# Patient Record
Sex: MALE | Race: WHITE | NOT HISPANIC OR LATINO | Employment: FULL TIME | URBAN - METROPOLITAN AREA
[De-identification: names, ages, dates, MRNs, and addresses within clinical notes are randomized per-mention and may not be internally consistent; named-entity substitution may affect disease eponyms.]

---

## 2024-03-31 ENCOUNTER — HOSPITAL ENCOUNTER (OUTPATIENT)
Dept: RADIOLOGY | Facility: MEDICAL CENTER | Age: 19
End: 2024-03-31
Attending: PHYSICIAN ASSISTANT
Payer: COMMERCIAL

## 2024-03-31 ENCOUNTER — OCCUPATIONAL MEDICINE (OUTPATIENT)
Dept: URGENT CARE | Facility: PHYSICIAN GROUP | Age: 19
End: 2024-03-31
Payer: COMMERCIAL

## 2024-03-31 VITALS
DIASTOLIC BLOOD PRESSURE: 76 MMHG | HEIGHT: 71 IN | OXYGEN SATURATION: 98 % | RESPIRATION RATE: 16 BRPM | HEART RATE: 80 BPM | TEMPERATURE: 98.3 F | WEIGHT: 140 LBS | BODY MASS INDEX: 19.6 KG/M2 | SYSTOLIC BLOOD PRESSURE: 132 MMHG

## 2024-03-31 DIAGNOSIS — S92.422A CLOSED DISPLACED FRACTURE OF DISTAL PHALANX OF LEFT GREAT TOE, INITIAL ENCOUNTER: ICD-10-CM

## 2024-03-31 DIAGNOSIS — S90.212A SUBUNGUAL HEMATOMA OF GREAT TOE OF LEFT FOOT, INITIAL ENCOUNTER: ICD-10-CM

## 2024-03-31 DIAGNOSIS — S90.32XA CONTUSION OF LEFT FOOT, INITIAL ENCOUNTER: ICD-10-CM

## 2024-03-31 PROCEDURE — 73630 X-RAY EXAM OF FOOT: CPT | Mod: LT

## 2024-03-31 ASSESSMENT — ENCOUNTER SYMPTOMS: TINGLING: 0

## 2024-03-31 NOTE — LETTER
"    EMPLOYEE’S CLAIM FOR COMPENSATION/ REPORT OF INITIAL TREATMENT  FORM C-4  PLEASE TYPE OR PRINT    EMPLOYEE’S CLAIM - PROVIDE ALL INFORMATION REQUESTED   First Name                    TOMASZ Hui Last Name  Gio Birthdate                    2005                Sex  []M  []F Claim Number (Insurer’s Use Only)     Home Address  [address] Age  18 y.o. Height  1.803 m (5' 11\") Weight  63.5 kg (140 lb) Social Security Number     [facility] Zip  [address] Telephone  There are no phone numbers on file.   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 (if applicable)  \"Viz Picking\" Backroom inventory    How did this injury or occupational disease occur? (Be specific and answer in detail. 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   If you believe that you have an occupational disease, when did you first have knowledge of the disability and its relationship to your employment?  N/A Witnesses to the Accident (if applicable)  [place]      Nature of Injury or Occupational Disease  Workers' " Compensation  Part(s) of Body Injured or Affected  Foot (L) Toe (L) N/A    I CERTIFY THAT THE ABOVE IS TRUE AND CORRECT TO T HE BEST OF MY KNOWLEDGE AND THAT I HAVE PROVIDED THIS INFORMATION IN ORDER TO OBTAIN THE BENEFITS OF [place], OR CHAPTER 617 [place]).  I HEREBY AUTHORIZE ANY PHYSICIAN, CHIROPRACTOR, SURGEON, PRACTITIONER OR ANY OTHER PERSON, ANY HOSPITAL, INCLUDING [facility] OR [facility], ANY  MEDICAL SERVICE ORGANIZATION, ANY INSURANCE COMPANY, OR OTHER INSTITUTION OR ORGANIZATION TO RELEASE TO EACH OTHER, ANY MEDICAL OR OTHER INFORMATION, INCLUDING BENEFITS PAID OR PAYABLE, PERTINENT TO THIS INJURY OR DISEASE, EXCEPT INFORMATION RELATIVE TO DIAGNOSIS, TREATMENT AND/OR COUNSELING FOR AIDS, PSYCHOLOGICAL CONDITIONS, ALCOHOL OR CONTROLLED SUBSTANCES, FOR WHICH I MUST GIVE SPECIFIC AUTHORIZATION.  A PHOTOSTAT OF THIS AUTHORIZATION SHALL BE VALID AS THE ORIGINAL.     Date   Place Employee’s Original or  *Electronic Signature   THIS REPORT MUST BE COMPLETED AND MAILED WITHIN 3 WORKING DAYS OF TREATMENT   Place  [facility]    Name of [place]   Date 3/31/2024 Diagnosis and Description of Injury or Occupational Disease  (S92.422A) Closed displaced fracture of distal phalanx of left great toe, initial encounter  (S90.212A) Subungual hematoma of great toe of left foot, initial encounter  Diagnoses of Closed displaced fracture of distal phalanx of left great toe, initial encounter and Subungual hematoma of great toe of left foot, initial encounter were pertinent to this visit. Is there evidence that the injured employee was under the influence of alcohol and/or another controlled substance at the time of accident?  []No  [] Yes (if yes, please explain)   Hour 3:38 PM  No   Treatment: Patient released to return to restricted duty.  Please allow patient to wear walking boot at all times while  at work.  Limit lifting to 25 pounds.  No climbing.  Advised ice, elevation and anti-inflammatory use to decrease pain and swelling.  We deferred performing nail trephination in clinic as the injury occurred 4 days ago and blood is likely coagulated at this time.  Referral placed to follow-up with occupational medicine and orthopedics on or before 4/8/2024.    Have you advised the patient to remain off work five days or more?   [] Yes Indicate dates: From   To    []No If no, is the injured employee capable of: [] full duty [] modified duty                                                             No  Yes  If modified duty, specify any limitations / restrictions:  See D-39                                                                                                                                                                                                                                                                                                                                                                                                               X-Ray Findings: Positive  Comments:Mildly displaced tuft fracture to left great toe    From information given by the employee, together with medical evidence, can you directly connect this injury or occupational disease as job incurred?  []Yes   [] No Yes    Is additional medical care by a physician indicated? []Yes [] No  Yes    Do you know of any previous injury or disease contributing to this condition or occupational disease? []Yes [] No (Explain if yes)                          No   Date  3/31/2024 Print Health Care Provider’s Name  Ishaan Welsh P.A.-C. I certify that the employer’s copy of  this form was delivered to the employer on:   Address  [address] INSURER'S USE ONLY                       [place]  [address] Provider’s Tax ID Number  [id number]   Telephone  Dept: [phone]    Health Care Provider’s Original or  "Electronic Signature  india-RAYA Lindquist P.A.-C. Degree (MD,DO, DC,KALYANI,APRN)  KALYANI  Choose (if applicable)      ORIGINAL - TREATING HEALTHCARE PROVIDER PAGE 2 - INSURER/TPA PAGE 3 - EMPLOYER PAGE 4 - EMPLOYEE             Form C-4 (rev.08/23)        BRIEF DESCRIPTION OF RIGHTS AND BENEFITS  (Pursuant to [place] 616C.050)    Notice of Injury or Occupational Disease (Incident Report Form C-1): If an injury or occupational disease (OD) arises out of and in the course of employment, you must provide written notice to your employer as soon as practicable, but no later than 7 days after the accident or OD. Your employer shall maintain a sufficient supply of the required forms.    Claim for Compensation (Form C-4): If medical treatment is sought, the form C-4 is available at the place of initial treatment. A completed \"Claim for Compensation\" (Form C-4) must be filed within 90 days after an accident or OD. The treating physician or chiropractor must, within 3 working days after treatment, complete and mail to the employer, the employer's insurer and third-party , the Claim for Compensation.    Medical Treatment: If you require medical treatment for your on-the-job injury or OD, you may be required to select a physician or chiropractor from a list provided by your workers’ compensation insurer, if it has contracted with an Organization for Managed Care (MCO) or Preferred Provider Organization (PPO) or providers of health care. If your employer has not entered into a contract with an MCO or PPO, you may select a physician or chiropractor from the Panel of Physicians and Chiropractors. Any medical costs related to your industrial injury or OD will be paid by your insurer.    Temporary Total Disability (TTD): If your doctor has certified that you are unable to work for a period of at least 5 consecutive days, or 5 cumulative days in a 20-day period, or places restrictions on you that your employer does not " accommodate, you may be entitled to TTD compensation.    Temporary Partial Disability (TPD): If the wage you receive upon reemployment is less than the compensation for TTD to which you are entitled, the insurer may be required to pay you TPD compensation to make up the difference. TPD can only be paid for a maximum of 24 months.    Permanent Partial Disability (PPD): When your medical condition is stable and there is an indication of a PPD as a result of your injury or OD, within 30 days, your insurer must arrange for an evaluation by a rating physician or chiropractor to determine the degree of your PPD. The amount of your PPD award depends on the date of injury, the results of the PPD evaluation, your age and wage.    Permanent Total Disability (PTD): If you are medically certified by a treating physician or chiropractor as permanently and totally disabled and have been granted a PTD status by your insurer, you are entitled to receive monthly benefits not to exceed 66 2/3% of your average monthly wage. The amount of your PTD payments is subject to reduction if you previously received a lump-sum PPD award.    Vocational Rehabilitation Services: You may be eligible for vocational rehabilitation services if you are unable to return to the job due to a permanent physical impairment or permanent restrictions as a result of your injury or occupational disease.    Transportation and Per Iris Reimbursement: You may be eligible for travel expenses and per iris associated with medical treatment.    Reopening: You may be able to reopen your claim if your condition worsens after claim closure.     Appeal Process: If you disagree with a written determination issued by the insurer or the insurer does not respond to your request, you may appeal to the Department of Administration, , by following the instructions contained in your determination letter. You must appeal the determination within 70 days from the date  of the determination letter at [address], [address], [place], [place] [address], or [address], [address], [place], [place] [address]. If you disagree with the  decision, you may appeal to the Department of Administration, . You must file your appeal within 30 days from the date of the  decision letter at [address], [address], [place], [place] [address], or [address], [address], [place], [place] [address]. If you disagree with a decision of an , you may file a petition for judicial review with the District Court. You must do so within 30 days of the Appeal Officer’s decision. You may be represented by an  at your own expense or you may contact the [facility] for possible representation.    [place]  for Injured Workers (NAIW): If you disagree with a  decision, you may request that NAIW represent you without charge at an  Hearing. For information regarding denial of benefits, you may contact the [facility] at: [address], [address], [place] [address], [phone], or [address], [address], [place] [address], [phone]    To File a Complaint with the Division: If you wish to file a complaint with the  of the Division of Industrial Relations (DIR),  please contact the Workers’ Compensation Section, [address], [address], [place], [place] [address], telephone [phone]) [phone], or [address], [address], [place], [place] [address], telephone ([phone]) [phone].    For assistance with Workers’ Compensation Issues: You may contact the [facility] Office for Consumer Health Assistance, [address], [address], [place], [place] [address], Toll Free 1-491.439.2794, Web site: http://[facility].[place].gov/Programs/MEGGAN E-mail: meggan@[facility].nv.gov              __________________________________________________________________                                     _________________            Employee Name / Signature                                                                                                                            Date                                                                                                                                                                                                                              D-2 (rev. 10/20)

## 2024-03-31 NOTE — LETTER
[facility]  [facility] [address] [place] [address]  Phone:  [phone] - Fax:  [phone]   [facility] Progress Report and Disability Certification  Date of Service: 3/31/2024   No Show:  No  Date / Time of Next Visit: 4/8/2024   Claim Information   Patient Name: Ez Powers  Claim Number:     Employer: [employer]  Date of Injury: 3/27/2024     Insurer / TPA: [employer]  ID / SSN:     Occupation: Stocking 1  Diagnosis: Diagnoses of Closed displaced fracture of distal phalanx of left great toe, initial encounter and Subungual hematoma of great toe of left foot, initial encounter were pertinent to this visit.    Medical Information   Related to Industrial Injury? Yes    Subjective Complaints:  HPI:  DOI: 3/27/2024  JACKSON:  This is a very pleasant 18-year-old male presented to the clinic after sustaining work-related injury.  The patient works at [employer].  States he was describing a box off the top shelf.  When sliding the box off the shelf the bottom of the box broke and a dumbbell fell out landing on his left foot primarily on his left great toe.  Dumbbell weight 30 pounds.  He was able to complete his workday at that time.  He has continued to have pain since the incident.  Pain aggravated with weightbearing.  Currently using crutches for ambulation at this time.  Nail is attached.  Denies any preceding injury or trauma.  No secondary employment.   Objective Findings: Constitutional: Pt is oriented to person, place, and time.  Appears well-developed and well-nourished. No distress.   Eyes: Conjunctivae are normal.   Cardiovascular: Normal rate.    Pulmonary/Chest: Effort normal.   Musculoskeletal: Left foot: Subungual hematoma of the left great toe.  Nail attached.  Mild swelling and discoloration to the great toe.  Patient has tenderness to palpation diffusely over the great toe as well as over the first MTP joint.  No tenderness,  bruising or discoloration over the midfoot.  Gait is antalgic.  Neurological: Pt is alert and oriented to person, place, and time. Coordination normal.   Skin: Skin is warm. Pt is not diaphoretic. No erythema.   Psychiatric: Pt has a normal mood and affect.  Behavior is normal.      Pre-Existing Condition(s):     Assessment:   Initial Visit    Status: Additional Care Required  Permanent Disability:No    Plan: Transfer Care    Diagnostics: X-ray    Comments:  X-rays reveal a mildly displaced tuft fracture to the left great toe    Disability Information   Status: Released to Restricted Duty    From:  3/31/2024  Through: 4/8/2024 Restrictions are: Temporary   Physical Restrictions   Sitting:    Standing:    Stooping:    Bending:      Squatting:    Walking:    Climbing:    Pushing:      Pulling:    Other:    Reaching Above Shoulder (L):   Reaching Above Shoulder (R):       Reaching Below Shoulder (L):    Reaching Below Shoulder (R):      Not to exceed Weight Limits   Carrying(hrs):   Weight Limit(lb):   Lifting(hrs):   Weight  Limit(lb):     Comments: Patient released to return to restricted duty.  Please allow patient to wear walking boot at all times while at work.  Limit lifting to 25 pounds.  No climbing.  Advised ice, elevation and anti-inflammatory use to decrease pain and swelling.  We deferred performing nail trephination in clinic as the injury occurred 4 days ago and blood is likely coagulated at this time.  Referral placed to follow-up with occupational medicine and orthopedics on or before 4/8/2024.      Repetitive Actions   Hands: i.e. Fine Manipulations from Grasping:     Feet: i.e. Operating Foot Controls:     Driving / Operate Machinery:     Health Care Provider’s Original or Electronic Signature  Ishaan Welsh P.A.-C. Health Care Provider’s Original or Electronic Signature    Tan Miller DO MPH     Clinic Name / Location: [facility] [address] [address] Clinic Phone  Number: Dept: [phone]   Appointment Time: 3:00 Pm Visit Start Time: 3:38 PM   Check-In Time:  3:28 Pm Visit Discharge Time:  4:45 PM   Original-Treating Physician or Chiropractor    Page 2-Insurer/TPA    Page 3-Employer    Page 4-Employee

## 2024-03-31 NOTE — PROGRESS NOTES
"Subjective     Ez Powers is a 18 y.o. male who presents with Foot Injury (Workers Comp new Lft foot injury. Box fell with dumb bell inside landing on Lft foot.)      HPI:  DOI: 3/27/2024  JACKSON:  This is a very pleasant 18-year-old male presented to the clinic after sustaining work-related injury.  The patient works at [employer]  States he was describing a box off the top shelf.  When sliding the box off the shelf the bottom of the box broke and a dumbbell fell out landing on his left foot primarily on his left great toe.  Dumbbell weight 30 pounds.  He was able to complete his workday at that time.  He has continued to have pain since the incident.  Pain aggravated with weightbearing.  Currently using crutches for ambulation at this time.  Nail is attached.  Denies any preceding injury or trauma.  No secondary employment.       Review of Systems   Musculoskeletal:         Contusion of left foot   Neurological:  Negative for tingling.          PMH:   No pertinent past medical history to this problem  MEDS:  Medications were reviewed in EMR  ALLERGIES:  Allergies were reviewed in EMR  FH:   No pertinent family history to this problem      Objective     /76   Pulse 80   Temp 36.8 °C (98.3 °F) (Temporal)   Resp 16   Ht 1.803 m (5' 11\")   Wt 63.5 kg (140 lb)   SpO2 98%   BMI 19.53 kg/m²      Physical Exam    Constitutional: Pt is oriented to person, place, and time.  Appears well-developed and well-nourished. No distress.   Eyes: Conjunctivae are normal.   Cardiovascular: Normal rate.    Pulmonary/Chest: Effort normal.   Musculoskeletal: Left foot: Subungual hematoma of the left great toe.  Nail attached.  Mild swelling and discoloration to the great toe.  Patient has tenderness to palpation diffusely over the great toe as well as over the first MTP joint.  No tenderness, bruising or discoloration over the midfoot.  Gait is antalgic.  Neurological: Pt is alert and oriented to person, place, and " time. Coordination normal.   Skin: Skin is warm. Pt is not diaphoretic. No erythema.   Psychiatric: Pt has a normal mood and affect.  Behavior is normal.        RADIOLOGY RESULTS   DX-FOOT-COMPLETE 3+ LEFT    Result Date: 3/31/2024  3/31/2024 4:07 PM HISTORY/REASON FOR EXAM: Left foot pain after fall at work 5 days ago. TECHNIQUE/EXAM DESCRIPTION AND NUMBER OF VIEWS: 3 nonweightbearing views of the LEFT foot. COMPARISON: FINDINGS: Bone mineralization is normal. There is a mildly displaced fracture involving the tuft of the great toe. There is soft tissue swelling.     Mildly displaced fracture involving the tuft of the great toe.              Assessment & Plan        1. Closed displaced fracture of distal phalanx of left great toe, initial encounter  DX-FOOT-COMPLETE 3+ LEFT    Referral to Orthopedics    Referral to Occupational Medicine      2. Subungual hematoma of great toe of left foot, initial encounter  DX-FOOT-COMPLETE 3+ LEFT           Patient released to return to restricted duty.  Please allow patient to wear walking boot at all times while at work.  Limit lifting to 25 pounds.  No climbing.  Advised ice, elevation and anti-inflammatory use to decrease pain and swelling.  We deferred performing nail trephination in clinic as the injury occurred 4 days ago and blood is likely coagulated at this time.  Referral placed to follow-up with occupational medicine and orthopedics on or before 4/8/2024.    Differential diagnosis, natural history, supportive care, and indications for immediate follow-up discussed at length.

## 2024-04-08 ENCOUNTER — OCCUPATIONAL MEDICINE (OUTPATIENT)
Dept: URGENT CARE | Facility: PHYSICIAN GROUP | Age: 19
End: 2024-04-08
Payer: COMMERCIAL

## 2024-04-08 VITALS
TEMPERATURE: 96.7 F | BODY MASS INDEX: 19.18 KG/M2 | HEIGHT: 71 IN | SYSTOLIC BLOOD PRESSURE: 108 MMHG | HEART RATE: 75 BPM | OXYGEN SATURATION: 98 % | WEIGHT: 137 LBS | RESPIRATION RATE: 16 BRPM | DIASTOLIC BLOOD PRESSURE: 70 MMHG
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
## 2024-04-08 DIAGNOSIS — S92.422D CLOSED DISPLACED FRACTURE OF DISTAL PHALANX OF LEFT GREAT TOE WITH ROUTINE HEALING, SUBSEQUENT ENCOUNTER: ICD-10-CM

## 2024-04-08 PROCEDURE — 3078F DIAST BP <80 MM HG: CPT

## 2024-04-08 PROCEDURE — 99213 OFFICE O/P EST LOW 20 MIN: CPT

## 2024-04-08 PROCEDURE — 3074F SYST BP LT 130 MM HG: CPT

## 2024-04-08 ASSESSMENT — ENCOUNTER SYMPTOMS
FEVER: 0
CHILLS: 0

## 2024-04-08 NOTE — LETTER
[facility]  [address] [facility] [place] [address]  Phone:  [phone] - Fax:  [phone]   [facility] Progress Report and Disability Certification  Date of Service: 4/8/2024   No Show:  No  Date / Time of Next Visit: 4/15/2024   Claim Information   Patient Name: Ez Powers  Claim Number:     Employer: [id number]  Date of Injury: 3/27/2024     Insurer / TPA: [employer]  ID / SSN:     Occupation: Stocking 1  Diagnosis: Diagnoses of Closed displaced fracture of distal phalanx of left great toe with routine healing, subsequent encounter and Work related injury were pertinent to this visit.    Medical Information   Related to Industrial Injury? Yes    Subjective Complaints:  Copied from previous visit:     DOI: 3/27/2024  JACKSON: This is a very pleasant 18-year-old male presented to the clinic after sustaining work-related injury.  The patient works at [employer].  States he was describing a box off the top shelf.  When sliding the box off the shelf the bottom of the box broke and a dumbbell fell out landing on his left foot primarily on his left great toe.  Dumbbell weight 30 pounds.  He was able to complete his workday at that time.  He has continued to have pain since the incident.  Pain aggravated with weightbearing.  Currently using crutches for ambulation at this time.  Nail is attached.  Denies any preceding injury or trauma.  No secondary employment.     FV #1 - 4/8/24  The patient reports that he is still having pain in the left foot. The pain is worse with weightbearing and the pain is improved with rest and elevation. He reports that the swelling has significantly improved with supportive measures. He is taking Excedrin in the AM, afternoon, and PM for pain. He feels that the anti-inflammatories are mildly helpful at improving his pain/inflammation. He has been adhering to all restrictions. Denies numbness in the foot. Intermittent  "\"pins and needles\" feeling. Subungual hematoma remains the same. Not worsening. No fevers. WBAT. Using crutches while wearing the boot.    Objective Findings: Left great toe: Subungual hematoma present without damage to the nail. Mild swelling and bruising. NV intact. Patient has tenderness to palpation diffusely over the great toe as well as over the first MTP joint.  No tenderness, bruising or discoloration over the midfoot.  Gait is antalgic. Limited flexion of the toe secondary to pain and swelling.   Pre-Existing Condition(s):     Assessment:   Condition Improved    Status: Additional Care Required  Permanent Disability:No    Plan: Transfer Care  Comments:Occ med, orthopedics    Diagnostics: X-ray    Comments:  X-ray images viewed and interpreted by APRN, confirmed by radiology:        RADIOLOGY RESULTS  DX-FOOT-COMPLETE 3+ LEFT    Result Date: 3/31/2024  3/31/2024 4:07 PM HISTORY/REASON FOR EXAM: Left foot pain after fall at work 5 days ago. TECHNIQUE/EXAM DESCRIPTION AND NUMBER OF VIEWS: 3 nonweightbearing views of the LEFT foot. COMPARISON: FINDINGS: Bone mineralization is normal. There is a mildly displaced fracture involving the tuft of the great toe. There is soft tissue swelling.     Mildly displaced fracture involving the tuft of the great toe.            Disability Information   Status: Released to Restricted Duty    From:  2024  Through: 4/15/2024 Restrictions are:     Physical Restrictions   Sitting:    Standin hrs/day Stoopin hrs/day Bending:      Squattin hrs/day Walkin hrs/day Climbin hrs/day Pushin hrs/day   Pulling:    Other:    Reaching Above Shoulder (L):   Reaching Above Shoulder (R):       Reaching Below Shoulder (L):    Reaching Below Shoulder (R):      Not to exceed Weight Limits   Carrying(hrs):   Weight Limit(lb): < or = to 25 pounds Lifting(hrs):   Weight  Limit(lb): < or = to 25 pounds   Comments: Advised continued light duty. Avoid periods of walking " as patient will need to avoid excessive amounts of time on his injured foot. Breaks PRN to elevate/ice the foot. Continue RICE therapy at home. Orthopedic consultation on 4/11/23. WBAT with boot/crutches.    Repetitive Actions   Hands: i.e. Fine Manipulations from Grasping:     Feet: i.e. Operating Foot Controls: 0 hrs/day   Driving / Operate Machinery:     Health Care Provider’s Original or Electronic Signature  JOSÉ MANUEL Dozier Health Care Provider’s Original or Electronic Signature    Tan Miller DO MPH     Clinic Name / Location: [address] [address] Clinic Phone Number: Dept: [phone]   Appointment Time: 5:45 Pm Visit Start Time: 6:30 PM   Check-In Time:  5:54 Pm Visit Discharge Time:  7:158 PM   Original-Treating Physician or Chiropractor    Page 2-Insurer/TPA    Page 3-Employer    Page 4-Employee

## 2024-04-09 NOTE — PROGRESS NOTES
"Subjective:   Ez Powers is a very pleasant 18 y.o. male who presents for:    Chief Complaint   Patient presents with    Toe Injury     Worker comp f/u, concerned about top of toe       HPI:    Copied from previous visit:     DOI: 3/27/2024  JACKSON: This is a very pleasant 18-year-old male presented to the clinic after sustaining work-related injury.  The patient works at [employer].  States he was describing a box off the top shelf.  When sliding the box off the shelf the bottom of the box broke and a dumbbell fell out landing on his left foot primarily on his left great toe.  Dumbbell weight 30 pounds.  He was able to complete his workday at that time.  He has continued to have pain since the incident.  Pain aggravated with weightbearing.  Currently using crutches for ambulation at this time.  Nail is attached.  Denies any preceding injury or trauma.  No secondary employment.     FV #1 - 4/8/24  The patient reports that he is still having pain in the left foot. The pain is worse with weightbearing and the pain is improved with rest and elevation. He reports that the swelling has significantly improved with supportive measures. He is taking Excedrin in the AM, afternoon, and PM for pain. He feels that the anti-inflammatories are mildly helpful at improving his pain/inflammation. He has been adhering to all restrictions. Denies numbness in the foot. Intermittent \"pins and needles\" feeling. Subungual hematoma remains the same. Not worsening. No fevers. WBAT. Using crutches while wearing the boot.     ROS:    Review of Systems   Constitutional:  Negative for chills, fever and malaise/fatigue.   Musculoskeletal:         Left great toe injury   All other systems reviewed and are negative.      Medications:      Current Outpatient Medications   Medication Sig    asa/apap/caffeine (EXCEDRIN) 250-250-65 MG Tab Take 1 Tablet by mouth every 6 hours as needed for Headache.       Allergies:     No Known " Allergies    Problem List:     There is no problem list on file for this patient.      Surgical History:    No past surgical history on file.    Past Social Hx:     Social History     Socioeconomic History    Marital status:    Tobacco Use    Smoking status: Every Day     Types: Cigarettes    Smokeless tobacco: Never   Vaping Use    Vaping Use: Every day   Substance and Sexual Activity    Alcohol use: Yes     Comment: occ.    Drug use: Yes     Types: Marijuana     Comment: daily        Past Family Hx:      No family history on file.    Problem list, medications, and allergies reviewed by myself today in Epic.     Objective:     There were no vitals filed for this visit.    Physical Exam  Vitals reviewed.   Constitutional:       Appearance: Normal appearance. He is normal weight.   Musculoskeletal:        Feet:    Feet:      Comments: Left great toe: Subungual hematoma present without damage to the nail. Mild swelling and bruising. NV intact. Patient has tenderness to palpation diffusely over the great toe as well as over the first MTP joint.  No tenderness, bruising or discoloration over the midfoot.  Gait is antalgic. Limited flexion of the toe secondary to pain and swelling.  Neurological:      Mental Status: He is alert.       X-ray images viewed and interpreted by APRN, confirmed by radiology:        RADIOLOGY RESULTS   DX-FOOT-COMPLETE 3+ LEFT    Result Date: 3/31/2024  3/31/2024 4:07 PM HISTORY/REASON FOR EXAM: Left foot pain after fall at work 5 days ago. TECHNIQUE/EXAM DESCRIPTION AND NUMBER OF VIEWS: 3 nonweightbearing views of the LEFT foot. COMPARISON: FINDINGS: Bone mineralization is normal. There is a mildly displaced fracture involving the tuft of the great toe. There is soft tissue swelling.     Mildly displaced fracture involving the tuft of the great toe.                   Assessment/Plan:     Diagnosis and associated orders:     1. Closed displaced fracture of distal phalanx of left great  toe with routine healing, subsequent encounter    2. Work related injury    Other orders  - asa/apap/caffeine (EXCEDRIN) 250-250-65 MG Tab; Take 1 Tablet by mouth every 6 hours as needed for Headache.          Comments/MDM:     See [id number]  Advised continued light duty. Avoid periods of walking as patient will need to avoid excessive amounts of time on his injured foot. Breaks PRN to elevate/ice the foot. Continue RICE therapy at home. Orthopedic consultation on 4/11/23. WBAT with boot/crutches.  RTC in 7 days         All questions answered. Patient verbalized understanding and is in agreement with this plan of care.     If symptoms are worsening or not improving in 3-5 days, follow-up with PCP or return to UC. Differential diagnosis, natural history, and supportive care discussed. AVS handout given and reviewed with patient. Patient educated on red flags and when to seek treatment back in ED or UC.     I personally reviewed prior external notes and test results pertinent to today's visit.  I have independently reviewed and interpreted all diagnostics ordered during this urgent care visit.     This dictation has been created using voice recognition software. The accuracy of the dictation is limited by the abilities of the software. I expect there may be some errors of grammar and possibly content. I made every attempt to manually correct the errors within my dictation. However, errors related to voice recognition software may still exist and should be interpreted within the appropriate context.    This note was electronically signed by JOSÉ MANUEL Perez

## 2025-04-13 ENCOUNTER — HOSPITAL ENCOUNTER (EMERGENCY)
Facility: MEDICAL CENTER | Age: 20
End: 2025-04-14
Attending: STUDENT IN AN ORGANIZED HEALTH CARE EDUCATION/TRAINING PROGRAM

## 2025-04-13 VITALS
RESPIRATION RATE: 16 BRPM | WEIGHT: 140 LBS | HEART RATE: 80 BPM | OXYGEN SATURATION: 96 % | HEIGHT: 71 IN | BODY MASS INDEX: 19.6 KG/M2 | SYSTOLIC BLOOD PRESSURE: 124 MMHG | TEMPERATURE: 98.3 F | DIASTOLIC BLOOD PRESSURE: 80 MMHG

## 2025-04-13 DIAGNOSIS — R45.851 SUICIDAL IDEATION: ICD-10-CM

## 2025-04-13 DIAGNOSIS — F10.920 ALCOHOLIC INTOXICATION WITHOUT COMPLICATION (HCC): ICD-10-CM

## 2025-04-13 LAB — POC BREATHALIZER: 0.11 PERCENT (ref 0–0.01)

## 2025-04-13 PROCEDURE — 80307 DRUG TEST PRSMV CHEM ANLYZR: CPT

## 2025-04-13 PROCEDURE — 99284 EMERGENCY DEPT VISIT MOD MDM: CPT

## 2025-04-13 PROCEDURE — 302970 POC BREATHALIZER

## 2025-04-13 PROCEDURE — 302970 POC BREATHALIZER: Performed by: STUDENT IN AN ORGANIZED HEALTH CARE EDUCATION/TRAINING PROGRAM

## 2025-04-14 LAB
AMPHET UR QL SCN: NEGATIVE
BARBITURATES UR QL SCN: NEGATIVE
BENZODIAZ UR QL SCN: NEGATIVE
BZE UR QL SCN: NEGATIVE
CANNABINOIDS UR QL SCN: POSITIVE
FENTANYL UR QL: NEGATIVE
METHADONE UR QL SCN: NEGATIVE
OPIATES UR QL SCN: NEGATIVE
OXYCODONE UR QL SCN: NEGATIVE
PCP UR QL SCN: NEGATIVE
POC BREATHALIZER: 0.06 PERCENT (ref 0–0.01)
PROPOXYPH UR QL SCN: NEGATIVE

## 2025-04-14 PROCEDURE — 302970 POC BREATHALIZER

## 2025-04-14 NOTE — ED NOTES
Patient observed in room, calm and cooperative. Mood appears stable; resting in NAD.  Verbalized no immediate needs or complaints.

## 2025-04-14 NOTE — CONSULTS
"[facility]   TRIAGE ASSESSMENT    Name: Ez Powers  MRN: [id number]  : 2005  Age: 19 y.o.  Date of assessment: 2025  PCP: Pcp Pt States None  Persons in attendance: Patient  Patient Location: [facility]  (This evaluation was conducted over the phone. The patient was located at [facility] and the Alert Team RN /Psychologist was located at [facility]. The patients identity was confirmed and verbal consent for the telephone conference was obtained.)     CHIEF COMPLAINT/PRESENTING ISSUE (as stated by Patient, ER RN, ERP):   Chief Complaint   Patient presents with    Suicidal Ideation     BIB-EMS Pt coming from home stating SI with no plan. Pt states having \"a couple beers and a few shots at home.\" Pt admits to having verbal altercation with fiance also states having a  baby at home that has caused added stressers. Pt is calm, cooperative, and seeking help for intrusive thoughts.       Patient is a 20 y/o male presenting to ER endorsing suicidal ideation without a plan or intent, triggered following argument with deric while intoxicated (initial breathalyzer of 0.122) Patient is now clinically sober and adamantly denies desire or plan to harm himself. Patient voices his baby as his reason for living. Reports small argument with deric was magnified in his mind d/t the alcohol. Patient reports he does not drink excessively, declines any assistance with alcohol cessation. Patient denies any hx of suicidal attempts but admits to cutting behaviors in highschool with last incident of self harm 2 years ago. Patient reports brief hx of of therapy through high school counselor and is now open to connecting with PMH provider in the community. Patient able to contract for safety; discussed with ERP and in agreement that patient does not meet legal hold criteria at this time.     CURRENT LIVING SITUATION/SOCIAL " SUPPORT/FINANCIAL RESOURCES: Patient resides with deric of 3 years and their 10 month old baby. Patient employed through [employer]. Patient reports good social support from  family    BEHAVIORAL HEALTH/SUBSTANCE USE TREATMENT HISTORY  Does patient/parent report a history of prior behavioral health/substance use treatment for patient?   No: Patient reports a brief hx of speaking with school counselor several years ago.     SAFETY ASSESSMENT - SELF  Does patient acknowledge current or past symptoms of dangerousness to self or is previous history noted? Yes; arrives to ER endorsing SI without plan or intent.   Does parent/significant other report patient has current or past symptoms of dangerousness to self? N\A  Does presenting problem suggest symptoms of dangerousness to self? No; patient is now clinically sober and denies any active thoughts of suicide or self harm; reports depression and intermittent/passive SI for several years; patient denies any hx of suicidal attempts, reports cutting behaviors in high school.    SAFETY ASSESSMENT - OTHERS  Does patient acknowledge current or past symptoms of aggressive behavior or risk to others or is previous history noted? no  Does parent/significant other report patient has current or past symptoms of aggressive behavior or risk to others?  N\A  Does presenting problem suggest symptoms of dangerousness to others? No; denies HI    LEGAL HISTORY  Does patient acknowledge history of arrest/senior living/correction or is previous history noted?     Crisis Safety Plan completed and copy given to patient? yes    ABUSE/NEGLECT SCREENING  Does patient report feeling “unsafe” in his/her home, or afraid of anyone?  no  Does patient report any history of physical, sexual, or emotional abuse?  no  Does parent or significant other report any of the above? N\A  Is there evidence of neglect by self?  no  Is there evidence of neglect by a caregiver? no  Does the patient/parent report any history of  "CPS/APS/police involvement related to suspected abuse/neglect or domestic violence? no  Based on the information provided during the current assessment, is a mandated report of suspected abuse/neglect being made?  No    SUBSTANCE USE SCREENING  Yes:  Nixon all substances used in the past 30 days:      Last Use Amount   [x]   Alcohol 4/13/25 \"Several beers and a couple shots\"   []   Marijuana     []   Heroin     []   Prescription Opioids  (used without prescription, for    recreation, or in excess of prescribed amount)     []   Other Prescription  (used without prescription, for    recreation, or in excess of prescribed amount)     []   Cocaine      []   Methamphetamine     []   \"\" drugs (ectasy, MDMA)     []   Other substances        UDS results: THC  Breathalyzer results: 0.112, 0.057    What consequences does the patient associate with any of the above substance use and or addictive behaviors? None    Risk factors for detox (check all that apply):  []  Seizures   []  Diaphoretic (sweating)   []  Tremors   []  Hallucinations   []  Increased blood pressure   []  Decreased blood pressure   []  Other   [x]  None      [x] Patient education on risk factors for detoxification and instructed to return to ER as needed.      MENTAL STATUS   Participation: Active verbal participation and Engaged  Grooming:  unable to assess  Orientation: Fully Oriented and Drowsy/Somnolent  Behavior: Calm  Eye contact: unable to assess  Mood: Depressed  Affect: unable to assess  Thought process: Logical and Goal-directed  Thought content: Within normal limits  Speech: Rate within normal limits and Volume within normal limits  Perception: Within normal limits  Memory:  No gross evidence of memory deficits  Insight: Adequate  Judgment:  Adequate  Other:    Collateral information:   Source:  [] Significant other present in person:   [] Significant other by telephone  [] [facility]   [x] [facility]  [x] [facility] " [facility]  [x] Other: ERP    [] Unable to complete full assessment due to:  [] Acute intoxication  [] Patient declined to participate/engage  [] Patient verbally unresponsive  [] Significant cognitive deficits  [] Significant perceptual distortions or behavioral disorganization  [x] Other: N/A     CLINICAL IMPRESSIONS:  Primary:  Passive SI, Alcohol Intoxication  Secondary:  Depression, Alcohol use, relational discord, depression       IDENTIFIED NEEDS/PLAN:  [Trigger DISPOSITION list for any items marked]    []  Imminent safety risk - self [] Imminent safety risk - others   []  Acute substance withdrawal []  Psychosis/Impaired reality testing   [x]  Mood/anxiety [x]  Substance use/Addictive behavior   []  Maladaptive behaviro []  Parent/child conflict   [x]  Family/Couples conflict []  Biomedical   []  Housing [x]  Financial   []   Legal  Occupational/Educational   []  Domestic violence []  Other:     Recommended Plan of Care:  Refer to [facility], [facility], and [facility] d/t uninsured status.     Has the Recommended Plan of Care/Level of Observation been reviewed with the patient's assigned nurse? yes    Does patient/parent or guardian express agreement with the above plan? Yes    Referral appointment(s) scheduled? N\A    Alert team only: Patient is clinically sober and denies any SI; patient able to contract for safety, voices his family is reason for living and able to safety plan home with printed resources for PMH providers in the community that work with uninsured.   I have discussed findings and recommendations with Dr. Mitchell who is in agreement with these recommendations.     Referral information sent to the following outpatient community providers : N/A    Referral information sent to the following inpatient community providers : N/A      Nova Hebert R.N.  4/14/2025

## 2025-04-14 NOTE — ED NOTES
Patient discharged in ambulatory state after the patient's parent verbally confirmed understanding of discharge paperwork and education provided by ER physician. Patient advised to return to the ER for any worsening pain of any other symptoms.

## 2025-04-14 NOTE — DISCHARGE INSTRUCTIONS
Please note that you can return to the ER at any time if you are feeling unsafe.  Utilize the resources discussed tonight for outpatient mental health follow-up assistance.

## 2025-04-14 NOTE — ED NOTES
Patient observed in room. Given ice water.  Mood appears stable; resting in NAD.  Verbalized no immediate needs or complaints.

## 2025-04-14 NOTE — DISCHARGE PLANNING
"    [facility]  Crisis/Safety Plan    Name:  Ez Powers  MRN:  [id number]  Date:  2025    Warning signs that a crisis may be developing for me or I may be at risk:  1) increase or excessive alcohol use  2) increasing anxiety  3) shaky  4) pacing    Coping strategies I can use on my own (relaxation, physical activity, etc):  1) play guitar  2) listening to music  3)     Ways I can make my environment safe:  1) secure all sharps  2) secure all medications  3) no access to firearms    Things I want to tell myself when I feel a crisis developin) validate feelings  2) \"I love my baby\" \"I love being a dad\"  3) \"I love my fiancee    People I can contact for support or distraction (and their phone numbers):  1) Shaun Adan  2) Brother Rai  3)    If I’m not able to reach my support people, or the above strategies don’t help, I can contact the following professionals, agencies, or hotlines:  1) Crisis Call Center ():  [phone] -OR- [phone]  2) Crisis Text Line ():  Text CARE TO 706937  3)   4)     Nova Hebert R.N.     "

## 2025-04-14 NOTE — ED NOTES
Patient observed in room, calm and cooperative. Mood appears stable; resting with visible chest rise and fall in NAD.  Verbalized no immediate needs or complaints. Safety check: Complete.

## 2025-04-14 NOTE — ED PROVIDER NOTES
"ED Provider Note    CHIEF COMPLAINT  Chief Complaint   Patient presents with    Suicidal Ideation     BIB-EMS Pt coming from home stating SI with no plan. Pt states having \"a couple beers and a few shots at home.\" Pt admits to having verbal altercation with fiance also states having a  baby at home that has caused added stressers. Pt is calm, cooperative, and seeking help for intrusive thoughts.        EXTERNAL RECORDS REVIEWED  Other no recent contributory records available.  Patient was seen by occupational health in 2024 for a toe injury.    HPI/ROS  LIMITATION TO HISTORY   Select: : None      Ez Powers is a 19 y.o. male who presents to the emergency room for evaluation of suicidal ideation.  The patient states that he was in an argument with his significant other and began to feel unsafe with thoughts of self-harm and wanting to kill himself.  He denies any specific plan but notes that he has tried to kill himself previously  by taking pills.  He also reports cutting behavior in the past.  He denies doing anything to harm himself tonight however and states that he had his significant other call EMS to transport him to the hospital due to feeling unsafe.  He reports alcohol and marijuana use tonight.  He denies any other symptoms.  He notes a history of depression.  He does not currently have a therapist, psychiatrist or psychologist or take any medications for his mental health.    PAST MEDICAL HISTORY   Depression    SURGICAL HISTORY  patient denies any surgical history    FAMILY HISTORY  No family history on file.    SOCIAL HISTORY  Social History     Tobacco Use    Smoking status: Every Day     Types: Cigarettes    Smokeless tobacco: Never   Vaping Use    Vaping status: Every Day   Substance and Sexual Activity    Alcohol use: Yes     Comment: occ.    Drug use: Yes     Types: Marijuana     Comment: daily    Sexual activity: Not on file       CURRENT MEDICATIONS  Home Medications  " "  **Home medications have not yet been reviewed for this encounter**         ALLERGIES  No Known Allergies    PHYSICAL EXAM  VITAL SIGNS: /80   Pulse 80   Temp 36.8 °C (98.3 °F) (Temporal)   Resp 16   Ht 1.803 m (5' 11\")   Wt 63.5 kg (140 lb)   SpO2 96%   BMI 19.53 kg/m²    Constitutional: No acute distress.  HENT: Normocephalic, Atraumatic, Bilateral external ears normal. Nose normal.   Eyes: Pupils are equal and reactive. Conjunctiva normal, non-icteric.   Heart: Regular rate and rythm,   Lungs: No respiratory distress  GI: Soft nontender nondistended   Musculoskeletal: No obvious deformity. No leg edema.  Skin: Warm, Dry, No erythema, No rash.   Neurologic: Alert and oriented x 3, Cranial nerves III through XII grossly intact no sensory deficit no cerebellar dysfunction.   Psychiatric: Suicidal      EKG/LABS  Labs Reviewed   URINE DRUG SCREEN - Abnormal; Notable for the following components:       Result Value    Cannabinoid Metab Positive (*)     All other components within normal limits   POC BREATHALIZER - Abnormal; Notable for the following components:    POC Breathalizer 0.112 (*)     All other components within normal limits         COURSE & MEDICAL DECISION MAKING    ASSESSMENT, COURSE AND PLAN  Care Narrative:     Patient presents to the ED brought in by EMS for evaluation of suicidal ideation without a plan.  History of suicide attempt via ingestion of medications in the past.  Expresses thoughts of suicide in the setting of worsening depression recently and acutely due to a verbal altercation with significant other tonight.  Denies any self-harm attempts tonight however.  Does report alcohol use and has a positive breathalyzer but is clinically sober, calm and cooperative and answering all questions very appropriately.  Vital signs are stable.  Will plan for observation and behavioral health consultation for safety plan attempt which I feel would be reasonable in the setting of resolution " of patient's suicidal thoughts with sobriety.    Once sober patient was able to have a long discussion with [facility] from behavioral health who agrees with my assessment that patient cannot appropriately safety plan.  He no longer feels suicidal, has clear forward thinking and multiple family members available who can assist him.  He was provided with outpatient resources for mental health follow-up in the community and encouraged to cease alcohol and drug use.  He was encouraged to return to the ER at any time if feeling unsafe.  Patient discharged in stable condition.    ADDITIONAL PROBLEMS MANAGED  None     DISPOSITION AND DISCUSSIONS  I have discussed management of the patient with the following physicians and ANASTASIA's: None    Discussion of management with other [facility] or appropriate source(s): [facility]      Escalation of care considered, and ultimately not performed:acute inpatient care management, however at this time, the patient is most appropriate for outpatient management    FINAL DIAGNOSIS  1. Suicidal ideation    2. Alcoholic intoxication without complication (HCC)         Electronically signed by: iGlson Mitchell M.D., 4/13/2025 11:23 PM